# Patient Record
Sex: MALE | Race: BLACK OR AFRICAN AMERICAN | NOT HISPANIC OR LATINO | Employment: OTHER | URBAN - METROPOLITAN AREA
[De-identification: names, ages, dates, MRNs, and addresses within clinical notes are randomized per-mention and may not be internally consistent; named-entity substitution may affect disease eponyms.]

---

## 2024-03-28 ENCOUNTER — OFFICE VISIT (OUTPATIENT)
Dept: PODIATRY | Facility: CLINIC | Age: 64
End: 2024-03-28
Payer: MEDICARE

## 2024-03-28 VITALS — HEIGHT: 73 IN | WEIGHT: 194 LBS | BODY MASS INDEX: 25.71 KG/M2

## 2024-03-28 DIAGNOSIS — L60.9 DISEASE OF NAIL: ICD-10-CM

## 2024-03-28 DIAGNOSIS — G60.9 IDIOPATHIC PERIPHERAL NEUROPATHY: Primary | ICD-10-CM

## 2024-03-28 PROCEDURE — 99203 OFFICE O/P NEW LOW 30 MIN: CPT | Mod: 25,S$GLB,, | Performed by: PODIATRIST

## 2024-03-28 PROCEDURE — 1159F MED LIST DOCD IN RCRD: CPT | Mod: CPTII,S$GLB,, | Performed by: PODIATRIST

## 2024-03-28 PROCEDURE — 3008F BODY MASS INDEX DOCD: CPT | Mod: CPTII,S$GLB,, | Performed by: PODIATRIST

## 2024-03-28 PROCEDURE — 1160F RVW MEDS BY RX/DR IN RCRD: CPT | Mod: CPTII,S$GLB,, | Performed by: PODIATRIST

## 2024-03-28 PROCEDURE — 11721 DEBRIDE NAIL 6 OR MORE: CPT | Mod: Q9,S$GLB,, | Performed by: PODIATRIST

## 2024-03-28 RX ORDER — SIMVASTATIN 40 MG/1
40 TABLET, FILM COATED ORAL NIGHTLY
COMMUNITY
Start: 2024-01-11

## 2024-03-28 RX ORDER — AMLODIPINE BESYLATE 10 MG/1
10 TABLET ORAL
COMMUNITY
Start: 2024-03-21

## 2024-03-28 RX ORDER — FLUTICASONE PROPIONATE 50 MCG
2 SPRAY, SUSPENSION (ML) NASAL
COMMUNITY
Start: 2024-02-11

## 2024-03-28 RX ORDER — SILDENAFIL CITRATE 20 MG/1
20 TABLET ORAL
COMMUNITY
Start: 2024-03-21

## 2024-03-28 RX ORDER — PREGABALIN 100 MG/1
100 CAPSULE ORAL 3 TIMES DAILY
COMMUNITY
Start: 2024-03-11

## 2024-03-28 RX ORDER — ERGOCALCIFEROL 1.25 MG/1
50000 CAPSULE ORAL
COMMUNITY
Start: 2024-02-08

## 2024-04-16 NOTE — PROGRESS NOTES
"Subjective:     Patient ID: Derrell De Santiago is a 63 y.o. male.    Chief Complaint: Foot Pain    Derrell is a 63 y.o. male who presents to the clinic for evaluation and treatment of high risk feet. Derrell has no past medical history on file. The patient's chief complaint is long, thick toenails. This patient has documented high risk feet requiring routine maintenance secondary to peripheral neuropathy.    PCP: Bety Joyner NP    Date Last Seen by PCP: 01/2024    Current shoe gear:  Affected Foot: Tennis shoes     Unaffected Foot: Tennis shoes    Last encounter in this department: Visit date not found    No results found for: "HGBA1C"    Review of Systems   Constitutional: Negative for chills and fever.   Cardiovascular:  Positive for leg swelling. Negative for chest pain.   Respiratory:  Negative for cough and shortness of breath.    Gastrointestinal:  Negative for diarrhea, nausea and vomiting.   Neurological:  Positive for numbness and paresthesias.        Objective:     Physical Exam  Vitals reviewed.   Constitutional:       General: He is not in acute distress.     Appearance: Normal appearance. He is not ill-appearing.   HENT:      Head: Normocephalic.      Nose: Nose normal.   Cardiovascular:      Rate and Rhythm: Normal rate.   Pulmonary:      Effort: Pulmonary effort is normal. No respiratory distress.   Skin:     Capillary Refill: Capillary refill takes 2 to 3 seconds.   Neurological:      Mental Status: He is alert and oriented to person, place, and time.   Psychiatric:         Mood and Affect: Mood normal.         Behavior: Behavior normal.         Thought Content: Thought content normal.        Neurologic: Protective and light touch sensation intact to the left lower extremity, mild decrease in protective sensation to the lateral aspect of the right foot, positive paresthesias reported right foot positive numbness reported right foot   Musculoskeletal:  5/5 muscle strength noted bilateral " foot, ankle joint range of motion is reduced with mild pain, no masses noted bilateral foot  Dermatologic: Nails 1 through 5 bilateral are thickened, discolored, and elongated with subungual debris, no open lesions noted bilateral foot, atrophic soft tissue skin changes noted bilateral lower extremity  Vascular: DP and PT pulses palpable 1/4 bilateral foot, capillary fill time less 3 seconds to distal aspect the digits bilateral foot, +1 nonpitting edema noted to the right ankle, pedal hair growth is absent to distal forefoot bilateral lower extremity, skin temperature gradient warm to cool from proximal to distal right lower extremity    Assessment:      Encounter Diagnoses   Name Primary?    Idiopathic peripheral neuropathy Yes    Disease of nail      Plan:     Derrell was seen today for foot pain.    Diagnoses and all orders for this visit:    Idiopathic peripheral neuropathy  -     Routine Foot Care    Disease of nail  -     Routine Foot Care      I counseled the patient on his conditions, their implications and medical management.         Patient was examined and evaluated.    2. Discussed with patient the etiology of nail fungus and as possible secondary issue to prior nail trauma.  Discussed with patient OTC topical conservative treatments such as Vicks vapor rub, tea tree oil as well as coconut oil.  Discussed with patient risks and benefits oral Lamisil therapy.   Routine Foot Care    Date/Time: 3/28/2024 10:45 AM    Performed by: Britt Diaz DPM  Authorized by: Britt Diaz DPM    Consent Done?:  Yes (Verbal)    Nail Care Type:  Debride  Location(s): All  (Left 1st Toe, Left 3rd Toe, Left 2nd Toe, Left 4th Toe, Left 5th Toe, Right 1st Toe, Right 2nd Toe, Right 3rd Toe, Right 4th Toe and Right 5th Toe)  Patient tolerance:  Patient tolerated the procedure well with no immediate complications      3.   Discussed with patient etiology of peripheral neuropathy.  Patient made aware that it is  possibly related to previous severe injury of the right lower extremity several years ago.  Patient was advised of potential risks and benefits of oral neuropathic pain medications.  Patient was advised to ice and elevate the right lower extremity at end of days.  4.  Patient will follow-up in 4-6 months or p.r.n. for complaints